# Patient Record
Sex: FEMALE | Race: WHITE | NOT HISPANIC OR LATINO | Employment: FULL TIME | URBAN - METROPOLITAN AREA
[De-identification: names, ages, dates, MRNs, and addresses within clinical notes are randomized per-mention and may not be internally consistent; named-entity substitution may affect disease eponyms.]

---

## 2017-03-16 DIAGNOSIS — Z12.31 ENCOUNTER FOR SCREENING MAMMOGRAM FOR MALIGNANT NEOPLASM OF BREAST: ICD-10-CM

## 2017-04-14 ENCOUNTER — ALLSCRIPTS OFFICE VISIT (OUTPATIENT)
Dept: OTHER | Facility: OTHER | Age: 62
End: 2017-04-14

## 2017-05-13 ENCOUNTER — OFFICE VISIT (OUTPATIENT)
Dept: URGENT CARE | Facility: CLINIC | Age: 62
End: 2017-05-13
Payer: OTHER MISCELLANEOUS

## 2017-05-13 PROCEDURE — 99283 EMERGENCY DEPT VISIT LOW MDM: CPT

## 2017-05-13 PROCEDURE — G0382 LEV 3 HOSP TYPE B ED VISIT: HCPCS

## 2017-05-16 ENCOUNTER — OFFICE VISIT (OUTPATIENT)
Dept: URGENT CARE | Facility: CLINIC | Age: 62
End: 2017-05-16
Payer: OTHER MISCELLANEOUS

## 2017-05-16 PROCEDURE — 99214 OFFICE O/P EST MOD 30 MIN: CPT

## 2017-05-20 ENCOUNTER — OFFICE VISIT (OUTPATIENT)
Dept: URGENT CARE | Facility: CLINIC | Age: 62
End: 2017-05-20
Payer: OTHER MISCELLANEOUS

## 2017-05-20 PROCEDURE — 99213 OFFICE O/P EST LOW 20 MIN: CPT

## 2017-09-01 DIAGNOSIS — I10 ESSENTIAL (PRIMARY) HYPERTENSION: ICD-10-CM

## 2017-09-01 DIAGNOSIS — E03.9 HYPOTHYROIDISM: ICD-10-CM

## 2017-09-01 DIAGNOSIS — Z12.31 ENCOUNTER FOR SCREENING MAMMOGRAM FOR MALIGNANT NEOPLASM OF BREAST: ICD-10-CM

## 2018-01-13 VITALS
HEART RATE: 80 BPM | SYSTOLIC BLOOD PRESSURE: 130 MMHG | DIASTOLIC BLOOD PRESSURE: 78 MMHG | HEIGHT: 65 IN | RESPIRATION RATE: 18 BRPM | TEMPERATURE: 98.2 F

## 2018-01-13 NOTE — RESULT NOTES
Verified Results  (1) CELIAC DISEASE AB PROFILE [id number] 12:28PM [place] Order Number: [id number]     Test Name Result Flag Reference   tTG IGG <2 U/mL  0 - 5   Negative        0 - 5                                Weak Positive   6 - 9                                Positive           >9   tTG IGA <2 U/mL  0 - 3   Negative        0 -  3                                Weak Positive   4 - 10                                Positive           >10   Tissue Transglutaminase (tTG) has been identified   as the endomysial antigen  Studies have demonstr-   ated that endomysial IgA antibodies have over 99%   specificity for gluten sensitive enteropathy     GLIADA 5 units  0 - 19   Negative                   0 - 19                     Weak Positive             20 - 30                     Moderate to Strong Positive   >30   GLIADG 2 units  0 - 19   Negative                   0 - 19                     Weak Positive             20 - 30                     Moderate to Strong Positive   >30   ENDOMYSIAL AB IGA Negative  Negative   Performed at:  [facility] [place] [address]  [id number]  : Fabienne Day MD, Phone:  [phone]   IGA 67 mg/dL L 87 - 471

## 2018-01-16 NOTE — RESULT NOTES
Verified Results  (1) HEPAT[facility] FUN[facility] PANEL [id number] 12:[facility]PM [facility]     Test Name Result Flag Reference   ALBUMIN 4 0 g/dL  3 5-5 0   - Patient Instructions: This is a non fasting blood test  Please drink two glasses of water the morning of test     - Patient Instructions: This is a non fasting blood test  Please drink two glasses of water the morning of test    ALK PHOSPHATAS 57 U/L     ALT (SGPT) 56 U/L  12-78   AST(SGOT) 18 U/L  5-45   BILI, DIRECT 0 12 mg/dL  0 00-0 20   BILI, TOTAL 0 61 mg/dL  0 20-1 00   TOTAL PROTEIN 7 0 g/dL  6 4-8 2     (1) CBC/PLT/DIFF [id number] 12:28PM [facility]     Test Name Result Flag Reference   WBC COUNT 5 65 Thousand/uL  4 31-10 16   RBC COUNT 4 17 Million/uL  3 81-5 12   HEMOGLOBIN 12 7 g/dL  11 5-15 4   HEMATOCRIT 39 0 %  34 8-46  1   MCV 94 fL  82-98   MCH 30 5 pg  26 8-34 3   MCHC 32 6 g/dL  31 4-37 4   RDW 14 2 %  11 6-15 1   MPV 10 9 fL  8 9-12 7   PLATELET COUNT 179 Thousands/uL  149-390   nRBC AUTOMATED 0 /100 WBCs     NEUTROPHILS RELATIVE PERCENT 61 %  43-75   LYMPHOCYTES RELATIVE PERCENT 32 %  14-44   MONOCYTES RELATIVE PERCENT 6 %  4-12   EOSINOPHILS RELATIVE PERCENT 1 %  0-6   BASOPHILS RELATIVE PERCENT 0 %  0-1   NEUTROPHILS ABSOLUTE COUNT 3 46 Thousands/?L  1 85-7 62   LYMPHOCYTES ABSOLUTE COUNT 1 79 Thousands/?L  0 60-4 47   MONOCYTES ABSOLUTE COUNT 0 32 Thousand/?L  0 17-1 22   EOSINOPHILS ABSOLUTE COUNT 0 06 Thousand/?L  0 00-0 61   BASOPHILS ABSOLUTE COUNT 0 01 Thousands/?L  0 00-0 10   - Patient Instructions: This bloodwork is non-fasting  Please drink two glasses of water morning of bloodwork  - Patient Instructions: This bloodwork is non-fasting  Please drink two glasses of water morning of bloodwork       (1) SED RATE [id number] 12:28PM [facility]     Test Name Result Flag Reference   SED RATE 4 mm/hour  0-20

## 2018-03-06 DIAGNOSIS — K21.9 GASTROESOPHAGEAL REFLUX DISEASE WITHOUT ESOPHAGITIS: Primary | ICD-10-CM

## 2018-03-06 RX ORDER — LISINOPRIL 10 MG/1
TABLET ORAL
Qty: 90 TABLET | Refills: 3 | OUTPATIENT
Start: 2018-03-06

## 2018-03-06 RX ORDER — OMEPRAZOLE 40 MG/1
CAPSULE, DELAYED RELEASE ORAL
Qty: 90 CAPSULE | Refills: 3 | Status: SHIPPED | OUTPATIENT
Start: 2018-03-06 | End: 2019-03-02 | Stop reason: SDUPTHER

## 2018-03-06 NOTE — TELEPHONE ENCOUNTER
I left a message for deandre on her home and cell-she needs to set up an appt before he will refill the lisinopril   js

## 2018-04-09 DIAGNOSIS — I10 ESSENTIAL HYPERTENSION: Primary | ICD-10-CM

## 2018-04-09 RX ORDER — LISINOPRIL 10 MG/1
10 TABLET ORAL DAILY
Qty: 90 TABLET | Refills: 1 | Status: SHIPPED | OUTPATIENT
Start: 2018-04-09 | End: 2018-10-06 | Stop reason: SDUPTHER

## 2018-04-11 DIAGNOSIS — M70.71 BURSITIS OF OTHER BURSA OF RIGHT HIP: Primary | ICD-10-CM

## 2018-04-11 RX ORDER — CELECOXIB 200 MG/1
CAPSULE ORAL
Qty: 90 CAPSULE | Refills: 4 | Status: SHIPPED | OUTPATIENT
Start: 2018-04-11 | End: 2019-07-05 | Stop reason: SDUPTHER

## 2018-04-25 ENCOUNTER — TRANSCRIBE ORDERS (OUTPATIENT)
Dept: LAB | Facility: CLINIC | Age: 63
End: 2018-04-25

## 2018-04-25 ENCOUNTER — APPOINTMENT (OUTPATIENT)
Dept: LAB | Facility: CLINIC | Age: 63
End: 2018-04-25
Payer: OTHER GOVERNMENT

## 2018-04-25 DIAGNOSIS — I10 ESSENTIAL (PRIMARY) HYPERTENSION: ICD-10-CM

## 2018-04-25 DIAGNOSIS — E03.9 HYPOTHYROIDISM: ICD-10-CM

## 2018-04-25 PROCEDURE — 85025 COMPLETE CBC W/AUTO DIFF WBC: CPT | Performed by: INTERNAL MEDICINE

## 2018-04-25 PROCEDURE — 84443 ASSAY THYROID STIM HORMONE: CPT | Performed by: INTERNAL MEDICINE

## 2018-04-25 PROCEDURE — 36415 COLL VENOUS BLD VENIPUNCTURE: CPT | Performed by: INTERNAL MEDICINE

## 2018-04-25 PROCEDURE — 80061 LIPID PANEL: CPT | Performed by: INTERNAL MEDICINE

## 2018-04-25 PROCEDURE — 80053 COMPREHEN METABOLIC PANEL: CPT | Performed by: INTERNAL MEDICINE

## 2018-04-30 ENCOUNTER — OFFICE VISIT (OUTPATIENT)
Dept: INTERNAL MEDICINE CLINIC | Facility: CLINIC | Age: 63
End: 2018-04-30
Payer: OTHER GOVERNMENT

## 2018-04-30 VITALS
HEART RATE: 78 BPM | DIASTOLIC BLOOD PRESSURE: 82 MMHG | TEMPERATURE: 98.3 F | SYSTOLIC BLOOD PRESSURE: 118 MMHG | HEIGHT: 66 IN | OXYGEN SATURATION: 94 % | RESPIRATION RATE: 16 BRPM

## 2018-04-30 DIAGNOSIS — Z78.0 ASYMPTOMATIC POSTMENOPAUSAL ESTROGEN DEFICIENCY: ICD-10-CM

## 2018-04-30 DIAGNOSIS — I10 ESSENTIAL HYPERTENSION: Primary | ICD-10-CM

## 2018-04-30 DIAGNOSIS — E03.8 HYPOTHYROIDISM DUE TO HASHIMOTO'S THYROIDITIS: ICD-10-CM

## 2018-04-30 DIAGNOSIS — Z12.31 ENCOUNTER FOR SCREENING MAMMOGRAM FOR MALIGNANT NEOPLASM OF BREAST: ICD-10-CM

## 2018-04-30 DIAGNOSIS — E06.3 HYPOTHYROIDISM DUE TO HASHIMOTO'S THYROIDITIS: ICD-10-CM

## 2018-04-30 DIAGNOSIS — E78.00 PURE HYPERCHOLESTEROLEMIA: ICD-10-CM

## 2018-04-30 PROCEDURE — 99214 OFFICE O/P EST MOD 30 MIN: CPT | Performed by: INTERNAL MEDICINE

## 2018-04-30 NOTE — PROGRESS NOTES
Assessment/Plan:       Diagnoses and all orders for this visit:    Encounter for screening mammogram for malignant neoplasm of breast  -     Mammo screening bilateral w 3d & cad; Future    Asymptomatic postmenopausal estrogen deficiency  -     DXA bone density spine hip and pelvis; Future        No problem-specific Assessment & Plan notes found for this encounter  We will followup in 6 months and see how things are going    Subjective:      Patient ID: Madi Vinson is a 65 y o  female  The patient's TSH was noted to be good and we will maintain her current dose of Levothyroxine  Her LDL cholesterol was noted to be elevated and she noted that she was intolerant of the statin medication at 20 mg and is cutting it in half  She does not want to try a different statin  We discussed a low cholesterol diet and exercise  She was amendable to this  Hypertension   This is a chronic problem  The current episode started more than 1 year ago  The problem is controlled  Pertinent negatives include no anxiety, blurred vision, chest pain, headaches, malaise/fatigue, neck pain, orthopnea, palpitations, peripheral edema, PND, shortness of breath or sweats  There are no associated agents to hypertension  Risk factors for coronary artery disease include dyslipidemia and sedentary lifestyle  Past treatments include ACE inhibitors  The current treatment provides significant improvement  There are no compliance problems  There is no history of angina, kidney disease, CAD/MI, CVA, heart failure or PVD  The following portions of the patient's history were reviewed and updated as appropriate:   She has a past medical history of Disease of thyroid gland; GERD (gastroesophageal reflux disease); Hyperlipidemia; and Hypertension  ,   does not have any pertinent problems on file  ,   has a past surgical history that includes Cholecystectomy and Hysterectomy  ,  family history includes Arthritis in her mother; Diabetes in her father; Hypertension in her father  ,   reports that she has quit smoking  She has never used smokeless tobacco  She reports that she drinks alcohol  She reports that she does not use drugs  ,  is allergic to meloxicam and codeine     Current Outpatient Prescriptions   Medication Sig Dispense Refill    celecoxib (CeleBREX) 200 mg capsule TAKE 1 CAPSULE DAILY 90 capsule 4    famotidine (PEPCID) 40 MG tablet Take 1 tablet by mouth 2 (two) times a day      levothyroxine 100 mcg tablet Take 1 tablet by mouth daily      lisinopril (ZESTRIL) 10 mg tablet Take 1 tablet (10 mg total) by mouth daily for 180 days 90 tablet 1    omeprazole (PriLOSEC) 40 MG capsule TAKE 1 CAPSULE DAILY 90 capsule 3    pravastatin (PRAVACHOL) 20 mg tablet Take half tablet daily  No current facility-administered medications for this visit  Review of Systems   Constitutional: Negative for chills, fatigue, fever and malaise/fatigue  HENT: Negative for ear pain, sinus pain and sinus pressure  Eyes: Negative for blurred vision  Respiratory: Negative for choking and shortness of breath  Cardiovascular: Negative for chest pain, palpitations, orthopnea and PND  Gastrointestinal: Negative for abdominal pain, constipation, diarrhea, nausea and vomiting  Genitourinary: Negative  Musculoskeletal: Negative for arthralgias, myalgias and neck pain  Neurological: Negative for headaches  Psychiatric/Behavioral: Negative  Objective:  Vitals:    04/30/18 0833   BP: 118/82   BP Location: Left arm   Patient Position: Sitting   Cuff Size: Standard   Pulse: 78   Resp: 16   Temp: 98 3 °F (36 8 °C)   SpO2: 94%   Height: 5' 6" (1 676 m)     There is no height or weight on file to calculate BMI  Physical Exam   Constitutional: She is oriented to person, place, and time  She appears well-developed and well-nourished  HENT:   Head: Normocephalic and atraumatic     Eyes: Conjunctivae and EOM are normal  Pupils are equal, round, and reactive to light  Neck: Normal range of motion  Neck supple  Cardiovascular: Normal rate, regular rhythm, normal heart sounds and intact distal pulses  Pulmonary/Chest: Effort normal and breath sounds normal    Abdominal: Soft  Bowel sounds are normal    Musculoskeletal: Normal range of motion  Neurological: She is alert and oriented to person, place, and time  She has normal reflexes  Skin: Skin is warm and dry  Psychiatric: She has a normal mood and affect

## 2018-05-07 DIAGNOSIS — E03.8 HYPOTHYROIDISM DUE TO HASHIMOTO'S THYROIDITIS: Primary | ICD-10-CM

## 2018-05-07 DIAGNOSIS — E06.3 HYPOTHYROIDISM DUE TO HASHIMOTO'S THYROIDITIS: Primary | ICD-10-CM

## 2018-05-07 RX ORDER — LEVOTHYROXINE SODIUM 0.1 MG/1
TABLET ORAL
Qty: 90 TABLET | Refills: 3 | Status: SHIPPED | OUTPATIENT
Start: 2018-05-07 | End: 2019-05-02 | Stop reason: SDUPTHER

## 2018-06-21 DIAGNOSIS — E78.00 HYPERCHOLESTEROLEMIA: Primary | ICD-10-CM

## 2018-06-21 RX ORDER — PRAVASTATIN SODIUM 20 MG
TABLET ORAL
Qty: 90 TABLET | Refills: 4 | Status: SHIPPED | OUTPATIENT
Start: 2018-06-21 | End: 2019-09-15 | Stop reason: SDUPTHER

## 2018-10-06 DIAGNOSIS — I10 ESSENTIAL HYPERTENSION: ICD-10-CM

## 2018-10-07 RX ORDER — LISINOPRIL 10 MG/1
TABLET ORAL
Qty: 90 TABLET | Refills: 1 | Status: SHIPPED | OUTPATIENT
Start: 2018-10-07 | End: 2019-04-06 | Stop reason: SDUPTHER

## 2018-12-29 DIAGNOSIS — K21.9 GASTROESOPHAGEAL REFLUX DISEASE WITHOUT ESOPHAGITIS: Primary | ICD-10-CM

## 2018-12-30 RX ORDER — FAMOTIDINE 40 MG/1
TABLET, FILM COATED ORAL
Qty: 180 TABLET | Refills: 3 | Status: SHIPPED | OUTPATIENT
Start: 2018-12-30 | End: 2019-12-29 | Stop reason: SDUPTHER

## 2019-03-02 DIAGNOSIS — K21.9 GASTROESOPHAGEAL REFLUX DISEASE WITHOUT ESOPHAGITIS: ICD-10-CM

## 2019-03-03 RX ORDER — OMEPRAZOLE 40 MG/1
CAPSULE, DELAYED RELEASE ORAL
Qty: 90 CAPSULE | Refills: 3 | Status: SHIPPED | OUTPATIENT
Start: 2019-03-03

## 2019-04-06 DIAGNOSIS — I10 ESSENTIAL HYPERTENSION: ICD-10-CM

## 2019-04-08 RX ORDER — LISINOPRIL 10 MG/1
TABLET ORAL
Qty: 90 TABLET | Refills: 1 | Status: SHIPPED | OUTPATIENT
Start: 2019-04-08 | End: 2019-10-05 | Stop reason: SDUPTHER

## 2019-05-02 DIAGNOSIS — E03.8 HYPOTHYROIDISM DUE TO HASHIMOTO'S THYROIDITIS: ICD-10-CM

## 2019-05-02 DIAGNOSIS — E06.3 HYPOTHYROIDISM DUE TO HASHIMOTO'S THYROIDITIS: ICD-10-CM

## 2019-05-02 RX ORDER — LEVOTHYROXINE SODIUM 0.1 MG/1
TABLET ORAL
Qty: 90 TABLET | Refills: 3 | Status: SHIPPED | OUTPATIENT
Start: 2019-05-02

## 2019-07-05 DIAGNOSIS — M70.71 BURSITIS OF OTHER BURSA OF RIGHT HIP: ICD-10-CM

## 2019-07-05 RX ORDER — CELECOXIB 200 MG/1
CAPSULE ORAL
Qty: 90 CAPSULE | Refills: 4 | Status: SHIPPED | OUTPATIENT
Start: 2019-07-05

## 2019-09-15 DIAGNOSIS — E78.00 HYPERCHOLESTEROLEMIA: ICD-10-CM

## 2019-09-16 RX ORDER — PRAVASTATIN SODIUM 20 MG
TABLET ORAL
Qty: 90 TABLET | Refills: 4 | Status: SHIPPED | OUTPATIENT
Start: 2019-09-16

## 2019-10-05 DIAGNOSIS — I10 ESSENTIAL HYPERTENSION: ICD-10-CM

## 2019-10-07 RX ORDER — LISINOPRIL 10 MG/1
TABLET ORAL
Qty: 90 TABLET | Refills: 4 | Status: SHIPPED | OUTPATIENT
Start: 2019-10-07

## 2019-12-29 DIAGNOSIS — K21.9 GASTROESOPHAGEAL REFLUX DISEASE WITHOUT ESOPHAGITIS: ICD-10-CM

## 2019-12-29 RX ORDER — FAMOTIDINE 40 MG/1
TABLET, FILM COATED ORAL
Qty: 180 TABLET | Refills: 4 | Status: SHIPPED | OUTPATIENT
Start: 2019-12-29

## 2020-01-15 ENCOUNTER — TELEPHONE (OUTPATIENT)
Dept: INTERNAL MEDICINE CLINIC | Facility: CLINIC | Age: 65
End: 2020-01-15

## 2020-02-24 ENCOUNTER — TELEPHONE (OUTPATIENT)
Dept: INTERNAL MEDICINE CLINIC | Facility: CLINIC | Age: 65
End: 2020-02-24

## 2020-02-24 NOTE — TELEPHONE ENCOUNTER
Tried to call [phone] - the home phone is not in service-lm for pt to call us back to sched rtn annual appt

## 2020-04-26 DIAGNOSIS — E03.8 HYPOTHYROIDISM DUE TO HASHIMOTO'S THYROIDITIS: ICD-10-CM

## 2020-04-26 DIAGNOSIS — E06.3 HYPOTHYROIDISM DUE TO HASHIMOTO'S THYROIDITIS: ICD-10-CM

## 2020-04-27 RX ORDER — LEVOTHYROXINE SODIUM 0.1 MG/1
TABLET ORAL
Qty: 90 TABLET | Refills: 3 | OUTPATIENT
Start: 2020-04-27